# Patient Record
(demographics unavailable — no encounter records)

---

## 2024-10-15 NOTE — CONSULT LETTER
[Dear  ___] : Dear  [unfilled], [Consult Letter:] : I had the pleasure of evaluating your patient, [unfilled]. [Please see my note below.] : Please see my note below. [Consult Closing:] : Thank you very much for allowing me to participate in the care of this patient.  If you have any questions, please do not hesitate to contact me. [Sincerely,] : Sincerely, [FreeTextEntry2] : ZACK SOLO MD [FreeTextEntry3] : Eduar Putnam MD Chief of Joint Replacement Primary & Revision Hip and Knee Replacement  [facility]

## 2024-10-28 NOTE — DISCUSSION/SUMMARY
[de-identified] : Plan:  - detailed discussion regarding the patient's left groin pain in layman's terms.   - MRI of the left hip to evaluate for full chondral loss that may not be appreciated on radiographic imaging of the left hip joint versus a degenerative labrum tear  - OTC tylenol as needed for pain.   - will f/u after MRI to discuss results. at that time, based off her results and symptoms, we can discuss a possible intra articular injection in the left hip.  - patient is amenable to the plan and all questions were answered.

## 2024-10-28 NOTE — PHYSICAL EXAM
[Left] : left hip with pelvis [Outside films reviewed] : Outside films reviewed [de-identified] : Constitutional: The patient appears well developed, well nourished. Examination of patients ability to communicate functionally was normal.   Gait: slow to get up out of a chair. walks with an antalgic gait and a cane.   Neurologic: Coordination is normal. Alert and oriented to time, place and person. No evidence of mood disorder, calm affect.        HIP/THIGH : Inspection of the hip/thigh is as follows: Inspection shows no swelling, no ecchymosis, no erythema, no rashes and no masses.   Palpation of the hip/thigh is as follows: groin tenderness. no palpable defects and no palpable masses.   Range of motion of the hip is as follows in degrees:   Flexion: 110   Abduction:  30   External rotation:  70  Internal rotation:  20, with pain  Strength testing of the hip/thigh is as follows: Hip flexion strength:   4/5  Hip extension strength:  5/5  Hip abductionstrength:  5-/5 Hip adductionstrength:  5-/5  Neurological testing of the hip/thigh is as follows: motor exam 5/5 throughout, light touch intact throughout and no focal motor defecits.   Quadriceps strength 5-/5 EHL testing 5-/5   LEFT KNEE  : Inspection of the knee is as follows: moderate effusion. no ecchymosis, no streaking, no erythema, no atrophy, no deformities of the quad tendon and no deformities of patellar tendon.   Palpation of the knee is as follows: medial joint line tenderness, lateral joint line tenderness, medial facet of patella tenderness, lateral facet of patella tenderness and crepitus. no palpable masses and no increased warmth.   Knee Range of Motion is as follows in degrees:    Extension: 0  Flexion: 110, with pain  Strength examination of the knee is as follows:  Quadriceps strength is 5/5  Hamstring strength is 5/5   Ligament Stability and Special Test ligamentously stable, negative anterior draw, negative Lachman test, negative posterior draw and no varus or valgus instability.  negative McMurrays test and able to do active straight leg raise without an extensor lag.   Neurological examination of the knee is as follows: light touch is intact throughout.   Gait and function is as follows: antalgic gait with a cane. [FreeTextEntry9] : 2 views of the left hip reviewed from  on 10/03. inferomedial joint space narrowing with subchondral sclerosis and osteophyte formation consistent with moderate to severe DJD. No acutre fractures noted. fibroid noted.

## 2024-10-28 NOTE — ASSESSMENT
[FreeTextEntry1] : 67 y/o F with history and physical exam consistent with left hip OA versus degenerative labrum tear.

## 2024-10-28 NOTE — HISTORY OF PRESENT ILLNESS
[de-identified] : Berta is a 69 y/o F with PMHx of myasthenia gravis who presents for left groin pain. Patient denies any trauma. Patient states that this groin pain is worse with activities such as getting up from a supine position and with activities, such as crossing her legs and getting in and out of a vehicle. Patient is on methylpredisone for her myasthenia gravis and despite oral steroid, her groin pain persists. The pain can radiate down to her left knee and she notes swelling in the left knee.

## 2025-05-02 NOTE — PLAN
[FreeTextEntry1] :  LEFT LEG SWELLING AND ERYTHEMA R/O DVT, GAIT/BALANCE DISTURBANCE, NORMAL GAIT W/ CANE USE ON EXAM, RECENT COVID19, HX MYASTHENIA GRAVIS ADVISED TO GO TO ED FOR FURTHER EVAL, PT REFUSED SENT TO  FOR STAT VENOUS DOPPLER LEFT LEG AND CTA ANGIO R/O STROKE STAT LABS ORDERED ADVISED PT SHE SHOULD GO TO THE ED FOR EVALUATION/ STAT LABS AND STAT RADIOLOGY AS I ADVISESD I WILL MOST LIKELY NOT GET THESE RESULTS TODAY SHE STILL REFUSED BC SHE SAID NOONE CAN WATCH HER PETS   F/U FOR RESULTS

## 2025-05-02 NOTE — REVIEW OF SYSTEMS
[Fever] : no fever [Chills] : no chills [Chest Pain] : no chest pain [Palpitations] : no palpitations [Shortness Of Breath] : no shortness of breath [FreeTextEntry9] : left leg swelling denies redness or warmth or pain  [de-identified] : c/o feeling disoriented with balance/gait  disturbance

## 2025-05-02 NOTE — HISTORY OF PRESENT ILLNESS
[FreeTextEntry8] : Patient presents dx with covid 4/18/25, feels symptoms getting worse, no energy and fatigue, diarrhea, dizzy, difficulty walking and breathing, chills  she said she was banging into the walls  she is on methylprednisolone for myasthenia gravis so she raised it by 2mg and she felt a little better she had a sore throat and she couldnt swallow she went to [facility] md  and was dx with covid 19  strep, and flu were neg she took paxlovid and had diarrhea from it and was given dexamethasone liquid  once the dexamethasone wore off she felt disoriented, chills, feeling hot, she said she is walking like she is drunk she is using a cane but she doesnt usually need one  if her myasthenia is not great she uses a cane to go to the mailbox  she doesnt feel like it is her myasthenia icking in   she was supposed to have a stress test with a cardiologist but didnt do it  yesterday she couldnt get out of bed

## 2025-05-02 NOTE — PHYSICAL EXAM
[No Lymphadenopathy] : no lymphadenopathy [Supple] : supple [Normal] : no respiratory distress, lungs were clear to auscultation bilaterally and no accessory muscle use [Normal Rate] : normal rate  [Regular Rhythm] : with a regular rhythm [Normal S1, S2] : normal S1 and S2 [No Focal Deficits] : no focal deficits [Normal Affect] : the affect was normal [Normal Mood] : the mood was normal [Normal Insight/Judgement] : insight and judgment were intact [de-identified] : possible systolic murmur [de-identified] : left leg edema and mild erythema no calf tenderness b/l or warmth b/l  [de-identified] : CN 2-12 INTACT, NORMAL STRENGTH UPPER AND LOWER EXT B/L 4/5, NORMAL RAPID ALTERNATING MOVEMENTS AND FINGER TO NOSE, NORMAL GAIT WITH CANE USENOTED  [de-identified] : aaox3

## 2025-05-06 NOTE — HISTORY OF PRESENT ILLNESS
[FreeTextEntry1] : Pt is a 70 y/o F who presents today for initial evaluation.  She has PMH HLD, preDM, myasthenia gravis on chronic steroids, MRI brain 01/2020 showed small old lacunar infarct- L superior thalamus, COPD She has multiple complaints today.  She notes SOB but is not sure if this is due to her COPD.  Recently sick with COVID.  Has occasional chest pain, not associated with exertion.  She denies diaphoresis, palpitations, dizziness, syncope, LE edema, PND, orthopnea.   TTE 12/2017 EF 60-65%, trace AI   Previous surgeries: knee surgery - no problems with anesthesia Family hx: no SCD, mother TAVR/"need MV replaced" 91, father CAD/PCI 70's Smoking status: never no ETOH no drug use Current exercise: none 0 children

## 2025-05-06 NOTE — DISCUSSION/SUMMARY
[EKG obtained to assist in diagnosis and management of assessed problem(s)] : EKG obtained to assist in diagnosis and management of assessed problem(s) [FreeTextEntry1] : Pt is a 68 y/o F PMH HLD, preDM, myasthenia gravis on chronic steroids, MRI brain 01/2020 showed small old lacunar infarct- L superior thalamus, COPD She has multiple complaints today.  She notes SOB but is not sure if this is due to her COPD.  Recently sick with COVID.  Has occasional chest pain, not associated with exertion.  She denies diaphoresis, palpitations, dizziness, syncope, LE edema, PND, orthopnea.   check CCTA  Will check transthoracic echocardiogram to evaluate left ventricular function and assess for any structural abnormalities  Advised pt to go to the nearest ED if symptoms persist or worsen   HLD: would like to start statin but she does not want to start at this time  Advised lifestyle modifications   The described plan was discussed with the pt.  All questions and concerns were addressed to the best of my knowledge.

## 2025-05-06 NOTE — PHYSICAL EXAM
[Well Developed] : well developed [Well Nourished] : well nourished [No Acute Distress] : no acute distress [Normal Conjunctiva] : normal conjunctiva [Normal Venous Pressure] : normal venous pressure [No Carotid Bruit] : no carotid bruit [Normal S1, S2] : normal S1, S2 [No Rub] : no rub [No Gallop] : no gallop [Clear Lung Fields] : clear lung fields [Good Air Entry] : good air entry [No Respiratory Distress] : no respiratory distress  [Soft] : abdomen soft [Non Tender] : non-tender [No Masses/organomegaly] : no masses/organomegaly [Normal Bowel Sounds] : normal bowel sounds [Normal Gait] : normal gait [No Edema] : no edema [No Cyanosis] : no cyanosis [No Clubbing] : no clubbing [No Varicosities] : no varicosities [No Rash] : no rash [No Skin Lesions] : no skin lesions [Moves all extremities] : moves all extremities [No Focal Deficits] : no focal deficits [Normal Speech] : normal speech [Alert and Oriented] : alert and oriented [Normal memory] : normal memory [de-identified] : +sys murmur

## 2025-05-19 NOTE — DISCUSSION/SUMMARY
[de-identified] : Diagnosis Hip Osteoarthritis   ROMARIO CASPER 69 year  F was seen and evaluated in the office today. Following evaluation, and history of the patient's condition at length, the pathology was explained in full to the patient in layman's terms. Patient has Hip Osteoarthritis. Osteoarthritis is a degenerative joint disease where the cartilage in the hip gradually wears away, leading to pain, stiffness, and reduced mobility. Initially, symptoms may be mild, however this is a progressive condition, and the pain is expected to become more severe and persistent. Advanced stages of hip OA can result in significant disability, making daily activities challenging. I discussed with the patient that since this condition is expected to continue to worsen, they will eventually need a total hip replacement in their life time.   In the interim, discussed with patient several different treatment options regarding managing the gradual loss of function associated with hip OA, along with specific risks and benefits. Nonsurgical options including but not limited to Corticosteroid Injection, Meloxicam 15mg, Medrol Dose Pack, along with activity modification such as non-impact exercise and organized physical therapy. The risk of morbidity associated with proposed treatments were discussed.   Furthermore, discussed with ROMARIO that they could also delay any immediate treatment options and continue to observe and self-care for the discussed problem. Discussed Home Exercise Programs as well as Rest, Ice and elevation. Patient had ample time to ask any questions about todays visit and the diagnosis, and all questions were answered. Patient verbally expressed they understand the plan going forward. -- She saw Cardiology and had echo and angiogramon Friday. Awaiting results. Discussed CSI left knee and evaluating her low back for possible radiculopathy She has mild edema to the Left thigh and lower leg Recommend she see vascular for doppler . She went for doppler on 5/2/25 however couldnt complete test due to pain/bruising.   Discussed TEDS At this time the patient was provided with information for local vascular specialist, will withhold any immediate treatment for left hip OA as she continues evaluation for LLE Vascular concerns. Patient expressed understanding of todays plan, all questions were answered to patients satisfaction Patient will follow up PRN, following consultation with Vascular.   Entered by Robert Ayala acting as scribe. Dr. Ramachandran Attestation The documentation recorded by the scribe, in my presence, accurately reflects the service I, Dr. Ramachandran, personally performed, and the decisions made by me with my edits as appropriate.

## 2025-05-19 NOTE — HISTORY OF PRESENT ILLNESS
[de-identified] : 05/19/2025  ROMARIO CASPER 69 year y/o F presents today for follow up on left knee pain and left hip pain.   Treatments since last visit: Patient reports that she did not go for an MRI of left hip. Patient reports that she takes Tylenol PRN with relief.  Changes Since Last Visit: Patient reports pain in hip is the same.   10/28/2024: Romario is a 69 y/o F with PMHx of myasthenia gravis who presents for left groin pain. Patient denies any trauma. Patient states that this groin pain is worse with activities such as getting up from a supine position and with activities, such as crossing her legs and getting in and out of a vehicle. Patient is on methylpredisone for her myasthenia gravis and despite oral steroid, her groin pain persists. The pain can radiate down to her left knee and she notes swelling in the left knee.

## 2025-05-19 NOTE — PHYSICAL EXAM
[de-identified] : Constitutional: The patient appears well developed, well nourished. Examination of patients ability to communicate functionally was normal.   Neurologic: Coordination is normal. Alert and oriented to time, place and person. No evidence of mood disorder, calm affect.     LEFT    HIP/THIGH : Inspection of the hip/thigh is as follows: Inspection shows no swelling, no ecchymosis, no erythema, no rashes and no masses.   Palpation of the hip/thigh is as follows: NO groin tenderness. no palpable defects and no palpable masses.   Range of motion of the hip is as follows in degrees:   Flexion: 110   Abduction:  30   External rotation:  70  Internal rotation:  20, with pain  Strength testing of the hip/thigh is as follows: Hip flexion strength:   5/5  Hip extension strength:  5/5  Hip abductionstrength:  5-/5 Hip adductionstrength:  5-/5  Neurological testing of the hip/thigh is as follows: motor exam 5/5 throughout, light touch intact throughout and no focal motor defecits.   Quadriceps strength 5-/5 EHL testing 5/5   LEFT KNEE  : Inspection of the knee is as follows: moderate effusion. no ecchymosis, no streaking, no erythema, no atrophy, no deformities of the quad tendon and no deformities of patellar tendon.   Palpation of the knee is as follows: medial joint line tenderness, lateral joint line tenderness, medial facet of patella tenderness, lateral facet of patella tenderness and crepitus. no palpable masses and no increased warmth.   Knee Range of Motion is as follows in degrees:    Extension: 0  Flexion: 110, with pain  Strength examination of the knee is as follows:  Quadriceps strength is 5/5  Hamstring strength is 5/5   Ligament Stability and Special Test ligamentously stable, negative anterior draw, negative Lachman test, negative posterior draw and no varus or valgus instability.  negative McMurrays test and able to do active straight leg raise without an extensor lag.   Neurological examination of the knee is as follows: light touch is intact throughout.

## 2025-05-28 NOTE — HISTORY OF PRESENT ILLNESS
[FreeTextEntry8] : Patient presents with lump left side of neck states feels like there is fluid  left leg swelling.  She noticed a swelling in the left side of her neck and collarbone area yesterday. She was bitten by a tick in October, and it was engorged. She has been having a snapping in the left hip and muscle cramping in her feet and the sensation of crawling on her legs.  Her feet get numb when she's walking.  The left leg gets cold and painful at times.

## 2025-05-28 NOTE — PHYSICAL EXAM
[No Acute Distress] : no acute distress [Well Nourished] : well nourished [Well Developed] : well developed [Well-Appearing] : well-appearing [Normal Voice/Communication] : normal voice/communication [Normal Mood] : the mood was normal [de-identified] : edema of LLE [de-identified] : soft, mobile mass in left clavicular area

## 2025-05-28 NOTE — REVIEW OF SYSTEMS
[Lower Ext Edema] : lower extremity edema [Joint Pain] : joint pain [Joint Stiffness] : joint stiffness [Joint Swelling] : joint swelling [Muscle Pain] : muscle pain [Back Pain] : back pain [Negative] : Heme/Lymph [FreeTextEntry5] : left leg gets cold and painful [de-identified] : swelling in left neck area [de-identified] : numbness and a crawling feeling

## 2025-05-28 NOTE — PLAN
[FreeTextEntry1] : I reviewed the doppler US and vascular notes. She was advised to speak with the vascular specialist regarding having arterial testing on the LLE.  She was advised to schedule a spine orthopedic consultation.  I ordered Lyme testing and she will take the Eliquis as prescribed.  The lipoma can be observed and isn't worrisome.

## 2025-05-30 NOTE — REVIEW OF SYSTEMS
[Recent Weight Gain (___ Lbs)] : recent [unfilled] ~Ulb weight gain [Back Pain] : back pain [Negative] : Heme/Lymph [FreeTextEntry6] : recent covid [FreeTextEntry8] : renal cyst [de-identified] : spine

## 2025-05-30 NOTE — PLAN
[FreeTextEntry1] : No pelvic exam, cannot tolerate Reviewed all CT scans and MRIs. Discussed unlikely problem with pap as she has not been sexually active in a very long time and has no hisotory of abnormal pap or HPV  CT scans and MRIs all seem stable with fibroid and cyst no change.  Will repeat CT scan of abdomen and pelvis for this year to make sure continues to be stable  Mammo ordered  Time spent 45 minutes

## 2025-05-30 NOTE — HISTORY OF PRESENT ILLNESS
[postmenopausal] : postmenopausal [N] : Patient is not sexually active [Y] : Positive pregnancy history [Previously active] : previously active [FreeTextEntry1] : 69 yr old P0 here for annual exam. Patient unable to tolerate any kind of pelvic exam for many years. No pap for many years. Cannot tolerate bimanual exam. Patient has tried in past with suppositories and creams and still cannot have exam. Patient has not been sexually active for many many years and never had abnormal pap smear in past. Patient has no history of HPV  Patient has had CT scans and MRI [facility] that have shown stable calcified fibroid and stable 2.8 cm right ovarian cyst. Last test 2024. Has seen surgeon in the past for these issues. Currently has no abdominal, pelvic complaints  Patient had some external treatment for fibroids long time ago that seem to help the size and bleeding.  Patient has multiple medical problems  Follow GI [Mammogramdate] : 08/24 [TextBox_19] : [facility] [BoneDensityDate] : 2023 [TextBox_37] : [facility] [ColonoscopyDate] : 2023 [TextBox_43] : DR. ARITA [PGxTotal] : 1 [PGHxAbortions] : 1

## 2025-06-12 NOTE — ASSESSMENT
[FreeTextEntry1] : 70 yo female presents today for eval of her LLE pain and weakness.   CT of the abdomen and pelvis with evidence of multiple issues, most prominently suspicion of cystic structure compressing the femoral vein. She has weakness, pain, and numbness of the LLE. Though there may be an overlap with low back issues, an MRI of the left hip is medically necessary for further evaluation of likely cystic structure possibly causing vascular compromise.   - Patient given prescription for L HIP MRI, follow up after study is completed to discuss results.   - Recommend LUMBAR physical therapy to regain range of motion, strengthening and symptomatic improvement. Prescription given in office today.   - Recommend follow up with Dr. Pascual following MRI   - ON ELIQUIS SECONDARY TO LLE DVT  - Recommend heating pad use to decrease muscle spasm - Discussed the importance of home exercises, including but not limited to hamstring stretching and core strengthening   Patient was educated on their diagnosis today. All questions answered and patient expressed understanding.  Follow up in 2 months

## 2025-06-12 NOTE — HISTORY OF PRESENT ILLNESS
[de-identified] : Body Part: lower back Length of symptoms/ DOI: ongoing for years Cause of accident/ injury: NKI Radicular symptoms: into the left hip and groin, has pain in her left leg due to DVT and is taking Eliquis 5mg BID Quality of pain: sharp, stabbing, throbbing Pain at Rest:  8/10 Pain with activity/ walkin/10 Pain worsens with: sitting, standing, walking, sleeping, exercising Pain improves with: Celebrex, Tylenol Previous treatments: PT, SERENITY with Dr. Rand in  Recent Imaging: MRI at [facility] in  Current treatments: none Current medications for pain: Tylenol, Celebrex PRN Work status/ occupation: retired Assisted walking device: cane